# Patient Record
Sex: MALE | URBAN - METROPOLITAN AREA
[De-identification: names, ages, dates, MRNs, and addresses within clinical notes are randomized per-mention and may not be internally consistent; named-entity substitution may affect disease eponyms.]

---

## 2020-11-24 ENCOUNTER — TELEPHONE (OUTPATIENT)
Dept: ENDOSCOPY | Facility: HOSPITAL | Age: 70
End: 2020-11-24

## 2020-11-24 NOTE — TELEPHONE ENCOUNTER
----- Message from Rigoberto Mills sent at 11/24/2020 11:25 AM CST -----  COCOA with the [facility] would like this order to be expedited if possible.  Referral in  for EUS. Please schedule and call patient

## 2020-12-07 ENCOUNTER — TELEPHONE (OUTPATIENT)
Dept: ENDOSCOPY | Facility: HOSPITAL | Age: 70
End: 2020-12-07

## 2020-12-07 NOTE — TELEPHONE ENCOUNTER
----- Message from Meghann Jeff MA sent at 12/7/2020  9:22 AM CST -----    ----- Message -----  From: Rigoberto Mills  Sent: 12/7/2020   9:04 AM CST  To: [facility] Clinical Staff    Please can this referral be expedited.per  [facility] agent Marck. [facility] called for referral status. Referral in .  Please Anna/supervisor  call Marck at  [phone]  ext [phone]

## 2020-12-08 ENCOUNTER — TELEPHONE (OUTPATIENT)
Dept: ENDOSCOPY | Facility: HOSPITAL | Age: 70
End: 2020-12-08

## 2020-12-08 NOTE — TELEPHONE ENCOUNTER
This was reviewed but there is no EGD report. Request have been made. Clinic  is going to attempted to request EGD report again. Unable to schedule with that being reviewed

## 2020-12-08 NOTE — TELEPHONE ENCOUNTER
----- Message from Theresa Parks MA sent at 12/8/2020  1:25 PM CST -----    ----- Message -----  From: Rigoberto Mills  Sent: 12/8/2020   1:15 PM CST  To: [facility] Clinical Staff    Patient returned called to [facility] agent, because he missed a call from Roma/supervisor.  Please call patient again asap.  He is wanting top schedule

## 2020-12-09 ENCOUNTER — TELEPHONE (OUTPATIENT)
Dept: GASTROENTEROLOGY | Facility: CLINIC | Age: 70
End: 2020-12-09

## 2020-12-09 ENCOUNTER — TELEPHONE (OUTPATIENT)
Dept: ENDOSCOPY | Facility: HOSPITAL | Age: 70
End: 2020-12-09

## 2020-12-09 DIAGNOSIS — K31.89 GASTRIC NODULE: Primary | ICD-10-CM

## 2020-12-09 NOTE — TELEPHONE ENCOUNTER
MD Linda Costello MA   Caller: Unspecified (Today, 11:48 AM)             Need EUS/EGD for gastric body nodule. Radial. Main or [facility]. 45 minutes.   Iker Capone MD      Please sign order

## 2020-12-09 NOTE — TELEPHONE ENCOUNTER
----- Message from Rigoberto Mills sent at 12/9/2020 11:41 AM CST -----  Marck with the [facility] is needing the patient consult referral ( EUS) expedited she says.  Referral is in . Please call patient to schedule.  Marck number at [facility] is [phone] ext [phone] would like a call when scheduled.

## 2020-12-09 NOTE — TELEPHONE ENCOUNTER
Spoke with Marck.   Patient did not have an EGD. Has a SML.  Please review. Records are under Media

## 2020-12-10 ENCOUNTER — TELEPHONE (OUTPATIENT)
Dept: ENDOSCOPY | Facility: HOSPITAL | Age: 70
End: 2020-12-10

## 2020-12-10 NOTE — TELEPHONE ENCOUNTER
----- Message from Megan Rodríguez sent at 12/10/2020  9:10 AM CST -----  Regarding: call back  Contact: pt  Pt was returning  from yesterday     Pt can be reached at [phone]

## 2020-12-11 ENCOUNTER — TELEPHONE (OUTPATIENT)
Dept: GASTROENTEROLOGY | Facility: CLINIC | Age: 70
End: 2020-12-11

## 2020-12-11 NOTE — TELEPHONE ENCOUNTER
----- Message from Rigoberto Mills sent at 12/11/2020 10:46 AM CST -----  Please ask supervisor Michelle to expedite appointment if possible. Patient missed her call and contacted [facility] agent Marck. Please try to call Mr. Antunez again. Referral in

## 2020-12-14 ENCOUNTER — TELEPHONE (OUTPATIENT)
Dept: ENDOSCOPY | Facility: HOSPITAL | Age: 70
End: 2020-12-14

## 2020-12-18 ENCOUNTER — TELEPHONE (OUTPATIENT)
Dept: ENDOSCOPY | Facility: HOSPITAL | Age: 70
End: 2020-12-18

## 2020-12-18 NOTE — TELEPHONE ENCOUNTER
Spoke with patient. EGD/EUS scheduled for 1/7 at 2p. Reviewed prep instructions. Mr garcia verbalized understanding.

## 2020-12-31 ENCOUNTER — TELEPHONE (OUTPATIENT)
Dept: ENDOSCOPY | Facility: HOSPITAL | Age: 70
End: 2020-12-31

## 2020-12-31 DIAGNOSIS — Z01.818 PRE-OP TESTING: ICD-10-CM

## 2020-12-31 RX ORDER — LISINOPRIL AND HYDROCHLOROTHIAZIDE 12.5; 2 MG/1; MG/1
1 TABLET ORAL DAILY
COMMUNITY

## 2020-12-31 RX ORDER — TIZANIDINE HYDROCHLORIDE 4 MG/1
CAPSULE, GELATIN COATED ORAL
COMMUNITY

## 2020-12-31 RX ORDER — PRAVASTATIN SODIUM 80 MG/1
80 TABLET ORAL DAILY
COMMUNITY

## 2020-12-31 RX ORDER — ASPIRIN 81 MG/1
81 TABLET ORAL DAILY
COMMUNITY

## 2020-12-31 RX ORDER — GABAPENTIN 300 MG/1
300 CAPSULE ORAL 3 TIMES DAILY
COMMUNITY

## 2020-12-31 NOTE — TELEPHONE ENCOUNTER
Received request to schedule patient for EUS/EGD on 1/7/21 at 2:00pm. Spoke with Patient. Reviewed medical history and medications. Instructed on procedure and prep. Patient verbalized understanding of instructions. E-mailed copy of instructions to address in chart.

## 2021-01-04 ENCOUNTER — LAB VISIT (OUTPATIENT)
Dept: INTERNAL MEDICINE | Facility: CLINIC | Age: 71
End: 2021-01-04
Payer: OTHER GOVERNMENT

## 2021-01-04 DIAGNOSIS — Z01.818 PRE-OP TESTING: ICD-10-CM

## 2021-01-04 PROCEDURE — U0003 INFECTIOUS AGENT DETECTION BY NUCLEIC ACID (DNA OR RNA); SEVERE ACUTE RESPIRATORY SYNDROME CORONAVIRUS 2 (SARS-COV-2) (CORONAVIRUS DISEASE [COVID-19]), AMPLIFIED PROBE TECHNIQUE, MAKING USE OF HIGH THROUGHPUT TECHNOLOGIES AS DESCRIBED BY CMS-2020-01-R: HCPCS

## 2021-01-05 LAB — SARS-COV-2 RNA RESP QL NAA+PROBE: NOT DETECTED

## 2021-01-07 ENCOUNTER — HOSPITAL ENCOUNTER (OUTPATIENT)
Facility: HOSPITAL | Age: 71
Discharge: HOME | End: 2021-01-07
Attending: INTERNAL MEDICINE | Admitting: INTERNAL MEDICINE
Payer: OTHER GOVERNMENT

## 2021-01-07 ENCOUNTER — ANESTHESIA EVENT (OUTPATIENT)
Dept: ENDOSCOPY | Facility: HOSPITAL | Age: 71
End: 2021-01-07
Payer: OTHER GOVERNMENT

## 2021-01-07 ENCOUNTER — ANESTHESIA (OUTPATIENT)
Dept: ENDOSCOPY | Facility: HOSPITAL | Age: 71
End: 2021-01-07
Payer: OTHER GOVERNMENT

## 2021-01-07 VITALS
OXYGEN SATURATION: 98 % | DIASTOLIC BLOOD PRESSURE: 77 MMHG | SYSTOLIC BLOOD PRESSURE: 165 MMHG | HEIGHT: 69 IN | BODY MASS INDEX: 23.7 KG/M2 | RESPIRATION RATE: 21 BRPM | WEIGHT: 160 LBS | HEART RATE: 72 BPM | TEMPERATURE: 98 F

## 2021-01-07 DIAGNOSIS — K31.89 GASTRIC NODULE: ICD-10-CM

## 2021-01-07 PROCEDURE — 25000003 PHARM REV CODE 250: Performed by: INTERNAL MEDICINE

## 2021-01-07 PROCEDURE — 25000003 PHARM REV CODE 250: Performed by: NURSE ANESTHETIST, CERTIFIED REGISTERED

## 2021-01-07 PROCEDURE — 43259 PR ENDOSCOPIC ULTRASOUND EXAM: ICD-10-PCS | Mod: ,,, | Performed by: INTERNAL MEDICINE

## 2021-01-07 PROCEDURE — 43259 EGD US EXAM DUODENUM/JEJUNUM: CPT | Performed by: INTERNAL MEDICINE

## 2021-01-07 PROCEDURE — 00813 ANES UPR LWR GI NDSC PX: CPT | Performed by: INTERNAL MEDICINE

## 2021-01-07 PROCEDURE — 37000008 HC ANESTHESIA 1ST 15 MINUTES: Performed by: INTERNAL MEDICINE

## 2021-01-07 PROCEDURE — 37000009 HC ANESTHESIA EA ADD 15 MINS: Performed by: INTERNAL MEDICINE

## 2021-01-07 PROCEDURE — D9220A PRA ANESTHESIA: Mod: ,,, | Performed by: ANESTHESIOLOGY

## 2021-01-07 PROCEDURE — 63600175 PHARM REV CODE 636 W HCPCS: Performed by: NURSE ANESTHETIST, CERTIFIED REGISTERED

## 2021-01-07 PROCEDURE — D9220A PRA ANESTHESIA: ICD-10-PCS | Mod: ,,, | Performed by: ANESTHESIOLOGY

## 2021-01-07 PROCEDURE — 43259 EGD US EXAM DUODENUM/JEJUNUM: CPT | Mod: ,,, | Performed by: INTERNAL MEDICINE

## 2021-01-07 RX ORDER — LIDOCAINE HYDROCHLORIDE 20 MG/ML
INJECTION INTRAVENOUS
Status: DISCONTINUED | OUTPATIENT
Start: 2021-01-07 | End: 2021-01-07

## 2021-01-07 RX ORDER — PROPOFOL 10 MG/ML
VIAL (ML) INTRAVENOUS
Status: DISCONTINUED | OUTPATIENT
Start: 2021-01-07 | End: 2021-01-07

## 2021-01-07 RX ORDER — PROPOFOL 10 MG/ML
VIAL (ML) INTRAVENOUS CONTINUOUS PRN
Status: DISCONTINUED | OUTPATIENT
Start: 2021-01-07 | End: 2021-01-07

## 2021-01-07 RX ORDER — SODIUM CHLORIDE 9 MG/ML
INJECTION, SOLUTION INTRAVENOUS CONTINUOUS
Status: DISCONTINUED | OUTPATIENT
Start: 2021-01-07 | End: 2021-01-07 | Stop reason: HOSPADM

## 2021-01-07 RX ORDER — ONDANSETRON 2 MG/ML
4 INJECTION INTRAMUSCULAR; INTRAVENOUS DAILY PRN
Status: DISCONTINUED | OUTPATIENT
Start: 2021-01-07 | End: 2021-01-07 | Stop reason: HOSPADM

## 2021-01-07 RX ORDER — SODIUM CHLORIDE 0.9 % (FLUSH) 0.9 %
3 SYRINGE (ML) INJECTION EVERY 30 MIN PRN
Status: DISCONTINUED | OUTPATIENT
Start: 2021-01-07 | End: 2021-01-07 | Stop reason: HOSPADM

## 2021-01-07 RX ADMIN — SODIUM CHLORIDE: 0.9 INJECTION, SOLUTION INTRAVENOUS at 01:01

## 2021-01-07 RX ADMIN — PROPOFOL 175 MCG/KG/MIN: 10 INJECTION, EMULSION INTRAVENOUS at 01:01

## 2021-01-07 RX ADMIN — PROPOFOL 50 MG: 10 INJECTION, EMULSION INTRAVENOUS at 01:01

## 2021-01-07 RX ADMIN — LIDOCAINE HYDROCHLORIDE 50 MG: 20 INJECTION, SOLUTION INTRAVENOUS at 01:01

## 2021-09-13 ENCOUNTER — TELEPHONE (OUTPATIENT)
Dept: ENDOSCOPY | Facility: HOSPITAL | Age: 71
End: 2021-09-13

## 2021-09-13 DIAGNOSIS — R19.8 ABNORMAL FINDINGS ON ESOPHAGOGASTRODUODENOSCOPY (EGD): Primary | ICD-10-CM

## 2021-09-17 ENCOUNTER — TELEPHONE (OUTPATIENT)
Dept: ENDOSCOPY | Facility: HOSPITAL | Age: 71
End: 2021-09-17

## 2021-09-22 ENCOUNTER — TELEPHONE (OUTPATIENT)
Dept: ENDOSCOPY | Facility: HOSPITAL | Age: 71
End: 2021-09-22

## 2021-09-22 DIAGNOSIS — Z01.818 PRE-OP TESTING: ICD-10-CM

## 2021-09-27 ENCOUNTER — LAB VISIT (OUTPATIENT)
Dept: SPORTS MEDICINE | Facility: CLINIC | Age: 71
End: 2021-09-27
Payer: OTHER GOVERNMENT

## 2021-09-27 DIAGNOSIS — Z01.818 PRE-OP TESTING: ICD-10-CM

## 2021-09-27 PROCEDURE — U0003 INFECTIOUS AGENT DETECTION BY NUCLEIC ACID (DNA OR RNA); SEVERE ACUTE RESPIRATORY SYNDROME CORONAVIRUS 2 (SARS-COV-2) (CORONAVIRUS DISEASE [COVID-19]), AMPLIFIED PROBE TECHNIQUE, MAKING USE OF HIGH THROUGHPUT TECHNOLOGIES AS DESCRIBED BY CMS-2020-01-R: HCPCS | Performed by: FAMILY MEDICINE

## 2021-09-27 PROCEDURE — U0005 INFEC AGEN DETEC AMPLI PROBE: HCPCS | Performed by: FAMILY MEDICINE

## 2021-09-28 LAB
SARS-COV-2 RNA RESP QL NAA+PROBE: NOT DETECTED
SARS-COV-2- CYCLE NUMBER: NORMAL

## 2021-09-30 ENCOUNTER — TELEPHONE (OUTPATIENT)
Dept: ENDOSCOPY | Facility: HOSPITAL | Age: 71
End: 2021-09-30

## 2021-10-19 ENCOUNTER — TELEPHONE (OUTPATIENT)
Dept: ENDOSCOPY | Facility: HOSPITAL | Age: 71
End: 2021-10-19

## 2021-10-19 DIAGNOSIS — R19.8 ABNORMAL FINDINGS ON ESOPHAGOGASTRODUODENOSCOPY (EGD): Primary | ICD-10-CM

## 2021-10-29 ENCOUNTER — ANESTHESIA EVENT (OUTPATIENT)
Dept: ENDOSCOPY | Facility: HOSPITAL | Age: 71
End: 2021-10-29
Payer: OTHER GOVERNMENT

## 2021-10-29 ENCOUNTER — HOSPITAL ENCOUNTER (OUTPATIENT)
Facility: HOSPITAL | Age: 71
Discharge: HOME | End: 2021-10-29
Attending: INTERNAL MEDICINE | Admitting: INTERNAL MEDICINE
Payer: OTHER GOVERNMENT

## 2021-10-29 ENCOUNTER — ANESTHESIA (OUTPATIENT)
Dept: ENDOSCOPY | Facility: HOSPITAL | Age: 71
End: 2021-10-29
Payer: OTHER GOVERNMENT

## 2021-10-29 VITALS
RESPIRATION RATE: 18 BRPM | WEIGHT: 142 LBS | HEIGHT: 70 IN | OXYGEN SATURATION: 95 % | SYSTOLIC BLOOD PRESSURE: 143 MMHG | HEART RATE: 60 BPM | BODY MASS INDEX: 20.33 KG/M2 | TEMPERATURE: 97 F | DIASTOLIC BLOOD PRESSURE: 70 MMHG

## 2021-10-29 DIAGNOSIS — K31.9 GASTRIC LESION: ICD-10-CM

## 2021-10-29 DIAGNOSIS — K31.89 GASTRIC NODULE: ICD-10-CM

## 2021-10-29 PROCEDURE — 27201012 HC FORCEPS, HOT/COLD, DISP: Performed by: INTERNAL MEDICINE

## 2021-10-29 PROCEDURE — 37000009 HC ANESTHESIA EA ADD 15 MINS: Performed by: INTERNAL MEDICINE

## 2021-10-29 PROCEDURE — 25000003 PHARM REV CODE 250: Performed by: NURSE ANESTHETIST, CERTIFIED REGISTERED

## 2021-10-29 PROCEDURE — 00811 ANES LWR INTST NDSC NOS: CPT | Performed by: INTERNAL MEDICINE

## 2021-10-29 PROCEDURE — 43259 EGD US EXAM DUODENUM/JEJUNUM: CPT | Mod: ,,, | Performed by: INTERNAL MEDICINE

## 2021-10-29 PROCEDURE — 63600175 PHARM REV CODE 636 W HCPCS: Performed by: NURSE ANESTHETIST, CERTIFIED REGISTERED

## 2021-10-29 PROCEDURE — 43239 PR EGD, FLEX, W/BIOPSY, SGL/MULTI: ICD-10-PCS | Mod: 59,,, | Performed by: INTERNAL MEDICINE

## 2021-10-29 PROCEDURE — 88305 TISSUE EXAM BY PATHOLOGIST: ICD-10-PCS | Mod: 26,,, | Performed by: PATHOLOGY

## 2021-10-29 PROCEDURE — 43239 EGD BIOPSY SINGLE/MULTIPLE: CPT | Mod: 59,,, | Performed by: INTERNAL MEDICINE

## 2021-10-29 PROCEDURE — D9220A PRA ANESTHESIA: ICD-10-PCS | Mod: ,,, | Performed by: ANESTHESIOLOGY

## 2021-10-29 PROCEDURE — 43259 PR ENDOSCOPIC ULTRASOUND EXAM: ICD-10-PCS | Mod: ,,, | Performed by: INTERNAL MEDICINE

## 2021-10-29 PROCEDURE — 43239 EGD BIOPSY SINGLE/MULTIPLE: CPT | Performed by: INTERNAL MEDICINE

## 2021-10-29 PROCEDURE — 88305 TISSUE EXAM BY PATHOLOGIST: CPT | Performed by: PATHOLOGY

## 2021-10-29 PROCEDURE — 88305 TISSUE EXAM BY PATHOLOGIST: CPT | Mod: 26,,, | Performed by: PATHOLOGY

## 2021-10-29 PROCEDURE — 43259 EGD US EXAM DUODENUM/JEJUNUM: CPT | Performed by: INTERNAL MEDICINE

## 2021-10-29 PROCEDURE — 37000008 HC ANESTHESIA 1ST 15 MINUTES: Performed by: INTERNAL MEDICINE

## 2021-10-29 PROCEDURE — D9220A PRA ANESTHESIA: Mod: ,,, | Performed by: ANESTHESIOLOGY

## 2021-10-29 RX ORDER — LIDOCAINE HYDROCHLORIDE 20 MG/ML
INJECTION INTRAVENOUS
Status: DISCONTINUED | OUTPATIENT
Start: 2021-10-29 | End: 2021-10-29

## 2021-10-29 RX ORDER — PROPOFOL 10 MG/ML
VIAL (ML) INTRAVENOUS
Status: DISCONTINUED | OUTPATIENT
Start: 2021-10-29 | End: 2021-10-29

## 2021-10-29 RX ORDER — SODIUM CHLORIDE 0.9 % (FLUSH) 0.9 %
10 SYRINGE (ML) INJECTION
Status: DISCONTINUED | OUTPATIENT
Start: 2021-10-29 | End: 2021-10-29 | Stop reason: HOSPADM

## 2021-10-29 RX ORDER — SODIUM CHLORIDE 9 MG/ML
INJECTION, SOLUTION INTRAVENOUS CONTINUOUS
Status: DISCONTINUED | OUTPATIENT
Start: 2021-10-29 | End: 2021-10-29 | Stop reason: HOSPADM

## 2021-10-29 RX ORDER — PROPOFOL 10 MG/ML
VIAL (ML) INTRAVENOUS CONTINUOUS PRN
Status: DISCONTINUED | OUTPATIENT
Start: 2021-10-29 | End: 2021-10-29

## 2021-10-29 RX ADMIN — PROPOFOL 50 MG: 10 INJECTION, EMULSION INTRAVENOUS at 01:10

## 2021-10-29 RX ADMIN — Medication 175 MCG/KG/MIN: at 01:10

## 2021-10-29 RX ADMIN — PROPOFOL 30 MG: 10 INJECTION, EMULSION INTRAVENOUS at 01:10

## 2021-10-29 RX ADMIN — SODIUM CHLORIDE: 0.9 INJECTION, SOLUTION INTRAVENOUS at 12:10

## 2021-10-29 RX ADMIN — LIDOCAINE HYDROCHLORIDE 40 MG: 20 INJECTION, SOLUTION INTRAVENOUS at 01:10

## 2021-10-29 RX ADMIN — PROPOFOL 20 MG: 10 INJECTION, EMULSION INTRAVENOUS at 01:10

## 2021-10-29 RX ADMIN — LIDOCAINE HYDROCHLORIDE 60 MG: 20 INJECTION, SOLUTION INTRAVENOUS at 01:10

## 2021-10-29 RX ADMIN — GLYCOPYRROLATE 0.1 MG: 0.2 INJECTION, SOLUTION INTRAMUSCULAR; INTRAVITREAL at 01:10

## 2021-11-18 LAB
FINAL PATHOLOGIC DIAGNOSIS: NORMAL
GROSS: NORMAL
Lab: NORMAL

## 2021-11-19 ENCOUNTER — TELEPHONE (OUTPATIENT)
Dept: ENDOSCOPY | Facility: HOSPITAL | Age: 71
End: 2021-11-19
Payer: OTHER GOVERNMENT

## 2022-10-10 DIAGNOSIS — R93.89 ABNORMAL FINDING ON IMAGING: Primary | ICD-10-CM

## 2022-10-11 ENCOUNTER — TELEPHONE (OUTPATIENT)
Dept: ENDOSCOPY | Facility: HOSPITAL | Age: 72
End: 2022-10-11
Payer: OTHER GOVERNMENT

## 2022-10-11 RX ORDER — OMEPRAZOLE 40 MG/1
40 CAPSULE, DELAYED RELEASE ORAL DAILY
COMMUNITY

## 2022-10-11 RX ORDER — CHOLECALCIFEROL (VITAMIN D3) 25 MCG
1000 TABLET ORAL DAILY
COMMUNITY

## 2022-10-11 RX ORDER — MIRTAZAPINE 30 MG/1
30 TABLET, FILM COATED ORAL NIGHTLY
COMMUNITY

## 2022-10-11 RX ORDER — FAMOTIDINE 40 MG/1
40 TABLET, FILM COATED ORAL DAILY
COMMUNITY

## 2022-10-11 RX ORDER — MELOXICAM 15 MG/1
15 TABLET ORAL DAILY
COMMUNITY

## 2022-10-11 RX ORDER — METHOCARBAMOL 750 MG/1
500 TABLET, FILM COATED ORAL 4 TIMES DAILY
COMMUNITY

## 2022-10-11 RX ORDER — SILDENAFIL 100 MG/1
100 TABLET, FILM COATED ORAL DAILY PRN
COMMUNITY

## 2022-10-11 RX ORDER — METOPROLOL SUCCINATE 50 MG/1
50 TABLET, EXTENDED RELEASE ORAL DAILY
COMMUNITY

## 2022-10-11 RX ORDER — MULTIVIT WITH MINERALS/HERBS
1 TABLET ORAL DAILY
COMMUNITY

## 2022-10-11 NOTE — TELEPHONE ENCOUNTER
Telephoned pt to schedule EGD/EUS.  Spoke with pt and procedure scheduled for 10/19/22 at 9:45am at [facility].  Pt is fully vaccinated.  Reviewed medical history and medications.  Pt is currently taking Eliquis prescribed by [facility].  Approval requested from Meghann Bernal with [facility]-pending approval.  Instructed on procedure and preparation.  Pt verbalized understanding.  Copy of instructions sent via email.

## 2022-10-14 ENCOUNTER — TELEPHONE (OUTPATIENT)
Dept: ENDOSCOPY | Facility: HOSPITAL | Age: 72
End: 2022-10-14
Payer: OTHER GOVERNMENT

## 2022-10-17 ENCOUNTER — TELEPHONE (OUTPATIENT)
Dept: ENDOSCOPY | Facility: HOSPITAL | Age: 72
End: 2022-10-17
Payer: OTHER GOVERNMENT

## 2022-10-17 NOTE — TELEPHONE ENCOUNTER
Spoke with patient about arrival time @ 0830.   EGD/Upper EUS  Covid test = vacc    NPO status reviewed: Patient may eat until 7:00pm.  After 7pm, pt may have CLEAR liquids ONLY until completely NPO at Midnight.    Medications: Do not take Insulin or oral diabetic medications the day of the procedure.    Take as prescribed: heart, seizure and blood pressure medication in the morning with a sip of water (less than an ounce).  Take any breathing medications and bring inhalers to hospital with you.     Leave all valuables and jewelry at home. Wear comfortable clothes to procedure to change into hospital gown.   You cannot drive for 24 hours after your procedure because you will receive sedation for your procedure to make you comfortable.    A ride must be provided at discharge.

## 2022-10-19 ENCOUNTER — ANESTHESIA (OUTPATIENT)
Dept: ENDOSCOPY | Facility: HOSPITAL | Age: 72
End: 2022-10-19
Payer: OTHER GOVERNMENT

## 2022-10-19 ENCOUNTER — ANESTHESIA EVENT (OUTPATIENT)
Dept: ENDOSCOPY | Facility: HOSPITAL | Age: 72
End: 2022-10-19
Payer: OTHER GOVERNMENT

## 2022-10-19 ENCOUNTER — HOSPITAL ENCOUNTER (OUTPATIENT)
Facility: HOSPITAL | Age: 72
Discharge: HOME | End: 2022-10-19
Attending: INTERNAL MEDICINE | Admitting: INTERNAL MEDICINE
Payer: OTHER GOVERNMENT

## 2022-10-19 VITALS
DIASTOLIC BLOOD PRESSURE: 70 MMHG | SYSTOLIC BLOOD PRESSURE: 123 MMHG | RESPIRATION RATE: 15 BRPM | HEART RATE: 85 BPM | OXYGEN SATURATION: 95 % | WEIGHT: 175 LBS | HEIGHT: 68 IN | TEMPERATURE: 98 F | BODY MASS INDEX: 26.52 KG/M2

## 2022-10-19 DIAGNOSIS — R93.3 ABNORMAL FINDING ON GI TRACT IMAGING: ICD-10-CM

## 2022-10-19 PROCEDURE — 88342 IMHCHEM/IMCYTCHM 1ST ANTB: CPT | Mod: 26,,, | Performed by: PATHOLOGY

## 2022-10-19 PROCEDURE — 63600175 PHARM REV CODE 636 W HCPCS: Performed by: NURSE ANESTHETIST, CERTIFIED REGISTERED

## 2022-10-19 PROCEDURE — 88341 PR IHC OR ICC EACH ADD'L SINGLE ANTIBODY  STAINPR: ICD-10-PCS | Mod: 26,,, | Performed by: PATHOLOGY

## 2022-10-19 PROCEDURE — 00731 ANES UPR GI NDSC PX NOS: CPT | Performed by: INTERNAL MEDICINE

## 2022-10-19 PROCEDURE — 25000003 PHARM REV CODE 250: Performed by: INTERNAL MEDICINE

## 2022-10-19 PROCEDURE — 43239 PR EGD, FLEX, W/BIOPSY, SGL/MULTI: ICD-10-PCS | Mod: 59,,, | Performed by: INTERNAL MEDICINE

## 2022-10-19 PROCEDURE — 88341 IMHCHEM/IMCYTCHM EA ADD ANTB: CPT | Mod: 26,,, | Performed by: PATHOLOGY

## 2022-10-19 PROCEDURE — 27201012 HC FORCEPS, HOT/COLD, DISP: Performed by: INTERNAL MEDICINE

## 2022-10-19 PROCEDURE — 43239 EGD BIOPSY SINGLE/MULTIPLE: CPT | Mod: 59 | Performed by: INTERNAL MEDICINE

## 2022-10-19 PROCEDURE — 88342 IMHCHEM/IMCYTCHM 1ST ANTB: CPT | Performed by: PATHOLOGY

## 2022-10-19 PROCEDURE — 88305 TISSUE EXAM BY PATHOLOGIST: CPT | Mod: 26,,, | Performed by: PATHOLOGY

## 2022-10-19 PROCEDURE — 88305 TISSUE EXAM BY PATHOLOGIST: ICD-10-PCS | Mod: 26,,, | Performed by: PATHOLOGY

## 2022-10-19 PROCEDURE — 88341 IMHCHEM/IMCYTCHM EA ADD ANTB: CPT | Performed by: PATHOLOGY

## 2022-10-19 PROCEDURE — 43239 EGD BIOPSY SINGLE/MULTIPLE: CPT | Mod: 59,,, | Performed by: INTERNAL MEDICINE

## 2022-10-19 PROCEDURE — 88342 CHG IMMUNOCYTOCHEMISTRY: ICD-10-PCS | Mod: 26,,, | Performed by: PATHOLOGY

## 2022-10-19 PROCEDURE — 43259 PR ENDOSCOPIC ULTRASOUND EXAM: ICD-10-PCS | Mod: ,,, | Performed by: INTERNAL MEDICINE

## 2022-10-19 PROCEDURE — 37000009 HC ANESTHESIA EA ADD 15 MINS: Performed by: INTERNAL MEDICINE

## 2022-10-19 PROCEDURE — 88305 TISSUE EXAM BY PATHOLOGIST: CPT | Performed by: PATHOLOGY

## 2022-10-19 PROCEDURE — 43259 EGD US EXAM DUODENUM/JEJUNUM: CPT | Mod: ,,, | Performed by: INTERNAL MEDICINE

## 2022-10-19 PROCEDURE — 37000008 HC ANESTHESIA 1ST 15 MINUTES: Performed by: INTERNAL MEDICINE

## 2022-10-19 PROCEDURE — 25000003 PHARM REV CODE 250: Performed by: NURSE ANESTHETIST, CERTIFIED REGISTERED

## 2022-10-19 PROCEDURE — 43259 EGD US EXAM DUODENUM/JEJUNUM: CPT | Performed by: INTERNAL MEDICINE

## 2022-10-19 RX ORDER — SODIUM CHLORIDE 0.9 % (FLUSH) 0.9 %
10 SYRINGE (ML) INJECTION
Status: DISCONTINUED | OUTPATIENT
Start: 2022-10-19 | End: 2022-10-19 | Stop reason: HOSPADM

## 2022-10-19 RX ORDER — DEXMEDETOMIDINE HYDROCHLORIDE 100 UG/ML
INJECTION, SOLUTION INTRAVENOUS
Status: DISCONTINUED | OUTPATIENT
Start: 2022-10-19 | End: 2022-10-19

## 2022-10-19 RX ORDER — PROPOFOL 10 MG/ML
VIAL (ML) INTRAVENOUS CONTINUOUS PRN
Status: DISCONTINUED | OUTPATIENT
Start: 2022-10-19 | End: 2022-10-19

## 2022-10-19 RX ORDER — PROPOFOL 10 MG/ML
VIAL (ML) INTRAVENOUS
Status: DISCONTINUED | OUTPATIENT
Start: 2022-10-19 | End: 2022-10-19

## 2022-10-19 RX ORDER — PHENYLEPHRINE HYDROCHLORIDE 10 MG/ML
INJECTION INTRAVENOUS
Status: DISCONTINUED | OUTPATIENT
Start: 2022-10-19 | End: 2022-10-19

## 2022-10-19 RX ORDER — LIDOCAINE HYDROCHLORIDE 20 MG/ML
INJECTION INTRAVENOUS
Status: DISCONTINUED | OUTPATIENT
Start: 2022-10-19 | End: 2022-10-19

## 2022-10-19 RX ORDER — SODIUM CHLORIDE 9 MG/ML
INJECTION, SOLUTION INTRAVENOUS CONTINUOUS
Status: DISCONTINUED | OUTPATIENT
Start: 2022-10-19 | End: 2022-10-19 | Stop reason: HOSPADM

## 2022-10-19 RX ADMIN — SODIUM CHLORIDE: 0.9 INJECTION, SOLUTION INTRAVENOUS at 09:10

## 2022-10-19 RX ADMIN — PHENYLEPHRINE HYDROCHLORIDE 100 MCG: 10 INJECTION INTRAVENOUS at 10:10

## 2022-10-19 RX ADMIN — PROPOFOL 70 MG: 10 INJECTION, EMULSION INTRAVENOUS at 10:10

## 2022-10-19 RX ADMIN — GLYCOPYRROLATE 0.2 MG: 0.2 INJECTION, SOLUTION INTRAMUSCULAR; INTRAVITREAL at 10:10

## 2022-10-19 RX ADMIN — PROPOFOL 150 MCG/KG/MIN: 10 INJECTION, EMULSION INTRAVENOUS at 10:10

## 2022-10-19 RX ADMIN — DEXMEDETOMIDINE HCL 8 MCG: 100 INJECTION INTRAVENOUS at 10:10

## 2022-10-19 RX ADMIN — LIDOCAINE HYDROCHLORIDE 100 MG: 20 INJECTION, SOLUTION INTRAVENOUS at 10:10

## 2022-10-19 NOTE — TRANSFER OF CARE
"Anesthesia Transfer of Care Note    Patient: Jaziel Antunez Jr.    Procedure(s) Performed: Procedure(s) (LRB):  ULTRASOUND, UPPER GI TRACT, ENDOSCOPIC (N/A)  EGD (ESOPHAGOGASTRODUODENOSCOPY) (N/A)    Patient location: GI    Anesthesia Type: MAC    Transport from OR: Transported from OR on room air with adequate spontaneous ventilation    Post pain: adequate analgesia    Post assessment: no apparent anesthetic complications    Post vital signs: stable    Level of consciousness: awake, alert and oriented    Nausea/Vomiting: no nausea/vomiting    Complications: none    Transfer of care protocol was followed      Last vitals:   Visit Vitals  BP (!) 157/88 (BP Location: Left arm, Patient Position: Lying)   Pulse 81   Temp 36.8 °C (98.2 °F) (Temporal)   Resp 18   Ht 5' 8" (1.727 m)   Wt 79.4 kg (175 lb)   SpO2 97%   BMI 26.61 kg/m²     "

## 2022-10-19 NOTE — PLAN OF CARE
Left a voicemail outlining visitor restrictions.     -Milagros Grey   Pt is ready for procedure. VSS. NPO status maintained. Bed in lowest position, call light within reach, and bed wheels locked.

## 2022-10-19 NOTE — ANESTHESIA POSTPROCEDURE EVALUATION
Anesthesia Post Evaluation    Patient: Jaziel Antunez Jr.    Procedure(s) Performed: Procedure(s) (LRB):  ULTRASOUND, UPPER GI TRACT, ENDOSCOPIC (N/A)  EGD (ESOPHAGOGASTRODUODENOSCOPY) (N/A)    Final Anesthesia Type: MAC      Patient location during evaluation: GI PACU  Patient participation: Yes- Able to Participate  Level of consciousness: awake and alert and oriented  Post-procedure vital signs: reviewed and stable  Pain management: adequate  Airway patency: patent    PONV status at discharge: No PONV  Anesthetic complications: no      Cardiovascular status: blood pressure returned to baseline  Respiratory status: unassisted, spontaneous ventilation and room air  Hydration status: euvolemic  Follow-up not needed.          Vitals Value Taken Time   BP 98/60 10/19/22 1105   Temp 36.6 °C (97.9 °F) 10/19/22 1105   Pulse 80 10/19/22 1105   Resp 20 10/19/22 1105   SpO2 98 % 10/19/22 1105         No case tracking events are documented in the log.      Pain/Jh Score: Jh Score: 9 (10/19/2022 11:05 AM)

## 2022-10-19 NOTE — H&P
Short Stay Endoscopy History and Physical    PCP - Primary Doctor No  Referring Physician - [facility]  [address]  [place] [address]    Procedure - EGD and EUS for abnormal imaging PET showed possible gastric mass along greater curve  ASA - per anesthesia  Mallampati - per anesthesia  History of Anesthesia problems - per anesthesia  Family history Anesthesia problems -  per anesthesia   Plan of anesthesia - per anesthesia    HPI:  This is a 72 y.o. male here for evaluation of: EGD and EUS for abnormal imaging PET showed possible gastric mass along greater curve - Eliquis held 48hr. Intermittent dysphagia since starting radiation therapy for lung cancer    Reflux - no  Dysphagia - intermittently (on radiation for lung ca)  Abdominal pain - no  Diarrhea - no    ROS:  Constitutional: No fevers, chills, No weight loss  CV: No chest pain  Pulm: No cough, No shortness of breath  Ophtho: No vision changes  GI: see HPI  Derm: No rash    Medical History:  has a past medical history of Arthritis, GERD (gastroesophageal reflux disease), Herniated thoracic disc without myelopathy, High cholesterol, HTN (hypertension), and Lung cancer (2022).    Surgical History:  has a past surgical history that includes Esophagogastroduodenoscopy (2015); Colonoscopy; right knee surgery; Endoscopic ultrasound of upper gastrointestinal tract (N/A, 1/7/2021); Esophagogastroduodenoscopy (N/A, 1/7/2021); Insertion of implantable loop recorder (Left); Endoscopic ultrasound of upper gastrointestinal tract (N/A, 10/29/2021); Esophagogastroduodenoscopy (N/A, 10/29/2021); Esophagogastroduodenoscopy (N/A, 10/29/2021); and Endoscopic ultrasound of upper gastrointestinal tract (N/A, 10/29/2021).    Family History: family history is not on file..    Social History:  reports that he quit smoking about 4 months ago. His smoking use included cigars. He has never used smokeless tobacco. He reports current  alcohol use. He reports that he does not currently use drugs.    Review of patient's allergies indicates:  No Known Allergies    Medications:   Medications Prior to Admission   Medication Sig Dispense Refill Last Dose    aspirin (ECOTRIN) 81 MG EC tablet Take 81 mg by mouth once daily.   Past Week    b complex vitamins tablet Take 1 tablet by mouth once daily.   Past Week    famotidine (PEPCID) 40 MG tablet Take 40 mg by mouth once daily.   Past Week    gabapentin (NEURONTIN) 300 MG capsule Take 300 mg by mouth 3 (three) times daily.   Past Week    lisinopriL-hydrochlorothiazide (PRINZIDE,ZESTORETIC) 20-12.5 mg per tablet Take 1 tablet by mouth once daily.   10/19/2022    meloxicam (MOBIC) 15 MG tablet Take 15 mg by mouth once daily.   Past Week    methocarbamoL (ROBAXIN) 750 MG Tab Take 500 mg by mouth 4 (four) times daily.   Past Week    metoprolol succinate (TOPROL-XL) 50 MG 24 hr tablet Take 50 mg by mouth once daily.   Past Week    mirtazapine (REMERON) 30 MG tablet Take 30 mg by mouth every evening.   Past Week    omeprazole (PRILOSEC) 40 MG capsule Take 40 mg by mouth once daily.   Past Week    tiZANidine 4 mg Cap Take by mouth.   Past Week    vitamin D (VITAMIN D3) 1000 units Tab Take 1,000 Units by mouth once daily.   Past Week    apixaban (ELIQUIS) 5 mg Tab Take 5 mg by mouth 2 (two) times daily.   10/17/2022    ipratropium-albuteroL (COMBIVENT)  mcg/actuation inhaler Inhale 1 puff into the lungs every 6 (six) hours as needed for Wheezing. Rescue   More than a month    pravastatin (PRAVACHOL) 80 MG tablet Take 80 mg by mouth once daily.       sildenafiL (VIAGRA) 100 MG tablet Take 100 mg by mouth daily as needed for Erectile Dysfunction.          Physical Exam:    Vital Signs:   Vitals:    10/19/22 0936   BP: (!) 157/88   Pulse: 81   Resp: 18   Temp: 98.2 °F (36.8 °C)       General Appearance: Well appearing in no acute distress    Labs:  Lab Results   Component Value Date    INR 1.0 04/13/2011        I have explained the risks and benefits of this endoscopic procedure to the patient including but not limited to bleeding, inflammation, infection, perforation, missing a lesion and death.      Thomas Lockett MD

## 2022-10-19 NOTE — [employer] PATIENT INSTRUCTIONS
Discharge Summary/Instructions after an Endoscopic Procedure  Patient Name: Jaziel Antunez  Patient MRN: [id number]  Patient YOB: 1950  Wednesday, October 19, 2022  Thomas Lockett MD  Dear patient,  As a result of recent federal legislation (The Federal Cures Act), you may   receive lab or pathology results from your procedure in your [facility]   account before your physician is able to contact you. Your physician or   their representative will relay the results to you with their   recommendations at their soonest availability.  Thank you,  Your health is very important to us during the Covid Crisis. Following your   procedure today, you will receive a daily text for 2 weeks asking about   signs or symptoms of Covid 19.  Please respond to this text when you   receive it so we can follow up and keep you as safe as possible.   RESTRICTIONS:  During your procedure today, you received medications for sedation.  These   medications may affect your judgment, balance and coordination.  Therefore,   for 24 hours, you have the following restrictions:   - DO NOT drive a car, operate machinery, make legal/financial decisions,   sign important papers or drink alcohol.    ACTIVITY:  Today: no heavy lifting, straining or running due to procedural   sedation/anesthesia.  The following day: return to full activity including work.  DIET:  Eat and drink normally unless instructed otherwise.     TREATMENT FOR COMMON SIDE EFFECTS:  - Mild abdominal pain, nausea, belching, bloating or excessive gas:  rest,   eat lightly and use a heating pad.  - Sore Throat: treat with throat lozenges and/or gargle with warm salt   water.  - Because air was used during the procedure, expelling large amounts of air   from your rectum or belching is normal.  - If a bowel prep was taken, you may not have a bowel movement for 1-3 days.    This is normal.  SYMPTOMS TO WATCH FOR AND REPORT TO YOUR PHYSICIAN:  1. Abdominal pain or bloating, other than  gas cramps.  2. Chest pain.  3. Back pain.  4. Signs of infection such as: chills or fever occurring within 24 hours   after the procedure.  5. Rectal bleeding, which would show as bright red, maroon, or black stools.   (A tablespoon of blood from the rectum is not serious, especially if   hemorrhoids are present.)  6. Vomiting.  7. Weakness or dizziness.  GO DIRECTLY TO THE NEAREST EMERGENCY ROOM IF YOU HAVE ANY OF THE FOLLOWING:      Difficulty breathing              Chills and/or fever over 101 F   Persistent vomiting and/or vomiting blood   Severe abdominal pain   Severe chest pain   Black, tarry stools   Bleeding- more than one tablespoon   Any other symptom or condition that you feel may need urgent attention  Your doctor recommends these additional instructions:  If any biopsies were taken, your doctors clinic will contact you in 1 to 2   weeks with any results.  - Discharge patient to home (ambulatory).   - Patient has a contact number available for emergencies.  The signs and   symptoms of potential delayed complications were discussed with the   patient.  Return to normal activities tomorrow.  Written discharge   instructions were provided to the patient.   - Resume previous diet.   - Await path results.   - Use a proton pump inhibitor PO daily or twice daily (such as OTC Nexium or   Prilosec) taken 30-60min before a meal to allow the mild esophagitis to   heal.   - Return to referring physician.  For questions, problems or results please call your physician - Thomas Lockett MD.  EMERGENCY PHONE NUMBER: [phone],  LAB RESULTS: [phone]  IF A COMPLICATION OR EMERGENCY SITUATION ARISES AND YOU ARE UNABLE TO REACH   YOUR PHYSICIAN - GO DIRECTLY TO THE EMERGENCY ROOM.  Thomas Lockett MD  10/19/2022 11:17:17 AM  This report has been verified and signed electronically.  Dear patient,  As a result of recent federal legislation (The Federal Cures Act), you may   receive lab or pathology results from  your procedure in your [facility]   account before your physician is able to contact you. Your physician or   their representative will relay the results to you with their   recommendations at their soonest availability.  Thank you,  [employer]ATION

## 2022-10-19 NOTE — ANESTHESIA PREPROCEDURE EVALUATION
10/19/2022  Jaziel Antunez Jr. is a 72 y.o., male   Pre-operative evaluation for Procedure(s) (LRB):  EGD (ESOPHAGOGASTRODUODENOSCOPY) (N/A)  ULTRASOUND, UPPER GI TRACT, ENDOSCOPIC (N/A)    Jaziel Antunez Jr. is a 72 y.o. male     Patient Active Problem List   Diagnosis    Gastric nodule       Review of patient's allergies indicates:  No Known Allergies    Current Facility-Administered Medications on File Prior to Visit   Medication Dose Route Frequency Provider Last Rate Last Admin    0.9%  NaCl infusion   Intravenous Continuous Thomas Lockett MD        sodium chloride 0.9% flush 10 mL  10 mL Intravenous PRN Thomas Lockett MD         Current Outpatient Medications on File Prior to Visit   Medication Sig Dispense Refill    apixaban (ELIQUIS) 5 mg Tab Take 5 mg by mouth 2 (two) times daily.      aspirin (ECOTRIN) 81 MG EC tablet Take 81 mg by mouth once daily.      b complex vitamins tablet Take 1 tablet by mouth once daily.      famotidine (PEPCID) 40 MG tablet Take 40 mg by mouth once daily.      gabapentin (NEURONTIN) 300 MG capsule Take 300 mg by mouth 3 (three) times daily.      ipratropium-albuteroL (COMBIVENT)  mcg/actuation inhaler Inhale 1 puff into the lungs every 6 (six) hours as needed for Wheezing. Rescue      lisinopriL-hydrochlorothiazide (PRINZIDE,ZESTORETIC) 20-12.5 mg per tablet Take 1 tablet by mouth once daily.      meloxicam (MOBIC) 15 MG tablet Take 15 mg by mouth once daily.      methocarbamoL (ROBAXIN) 750 MG Tab Take 500 mg by mouth 4 (four) times daily.      metoprolol succinate (TOPROL-XL) 50 MG 24 hr tablet Take 50 mg by mouth once daily.      mirtazapine (REMERON) 30 MG tablet Take 30 mg by mouth every evening.      omeprazole (PRILOSEC) 40 MG capsule Take 40 mg by mouth once daily.      pravastatin (PRAVACHOL) 80 MG tablet Take 80 mg by mouth once daily.       sildenafiL (VIAGRA) 100 MG tablet Take 100 mg by mouth daily as needed for Erectile Dysfunction.      tiZANidine 4 mg Cap Take by mouth.      vitamin D (VITAMIN D3) 1000 units Tab Take 1,000 Units by mouth once daily.         Past Surgical History:   Procedure Laterality Date    COLONOSCOPY      ENDOSCOPIC ULTRASOUND OF UPPER GASTROINTESTINAL TRACT N/A 1/7/2021    Procedure: ULTRASOUND, UPPER GI TRACT, ENDOSCOPIC;  Surgeon: Robert Luna MD;  Location: [facility] ENDO (2ND FLR);  Service: Endoscopy;  Laterality: N/A;  Need EUS/EGD for gastric body nodule. Radial. [facility] or [place]. 45 minutes.  1/4-covid pcw-tb    ENDOSCOPIC ULTRASOUND OF UPPER GASTROINTESTINAL TRACT N/A 10/29/2021    Procedure: ULTRASOUND, UPPER GI TRACT, ENDOSCOPIC;  Surgeon: Iker Capone MD;  Location: [facility] GELA (2ND FLR);  Service: Endoscopy;  Laterality: N/A;  Iker Capone MD  Lindachidi Senior MA  Caller: Unspecified (Yesterday,  5:03 PM)  Need EGD/EUS (radial) for enlarge gastric fold and gastric nodule. 45 minutes. [facility] or [place].   Thanks,   Iker Capone MD   9/27-covid [place]    ENDOSCOPIC ULTRASOUND OF UPPER GASTROINTESTINAL TRACT N/A 10/29/2021    Procedure: ULTRASOUND, UPPER GI TRACT, ENDOSCOPIC;  Surgeon: Iker Capone MD;  Location: [facility] (2ND FLR);  Service: Endoscopy;  Laterality: N/A;    ESOPHAGOGASTRODUODENOSCOPY  2015    ESOPHAGOGASTRODUODENOSCOPY N/A 1/7/2021    Procedure: EGD (ESOPHAGOGASTRODUODENOSCOPY);  Surgeon: Robert Luna MD;  Location: [facility] ENDO (2ND FLR);  Service: Endoscopy;  Laterality: N/A;    ESOPHAGOGASTRODUODENOSCOPY N/A 10/29/2021    Procedure: EGD (ESOPHAGOGASTRODUODENOSCOPY);  Surgeon: Iker Capone MD;  Location: [facility] ENDO (2ND FLR);  Service: Endoscopy;  Laterality: N/A;    ESOPHAGOGASTRODUODENOSCOPY N/A 10/29/2021    Procedure: EGD (ESOPHAGOGASTRODUODENOSCOPY);  Surgeon: Iker Capone MD;  Location: [facility] ([address] [place] [address]);  Service: Endoscopy;  Laterality: N/A;   pt vaccinated-notified to bring card to visit-instructions emailed-MG    INSERTION OF IMPLANTABLE LOOP RECORDER Left     right knee surgery           Anesthesia Evaluation    I have reviewed the Patient Summary Reports.    I have reviewed the Nursing Notes. I have reviewed the NPO Status.   I have reviewed the Medications.     Review of Systems  Anesthesia Hx:  No problems with previous Anesthesia Denies Hx of Anesthetic complications  History of prior surgery of interest to airway management or planning: Denies Family Hx of Anesthesia complications.   Denies Personal Hx of Anesthesia complications.   Social:  Smoker, Social Alcohol Use    Hematology/Oncology:  Hematology Normal   Oncology Normal     EENT/Dental:EENT/Dental Normal   Cardiovascular:   Exercise tolerance: good Hypertension    Pulmonary:  Pulmonary Normal    Renal/:  Renal/ Normal     Hepatic/GI:   GERD Gastric nodule   Neurological:  Neurology Normal    Endocrine:  Endocrine Normal    Psych:  Psychiatric Normal           Physical Exam  General:  Well nourished    Airway/Jaw/Neck:  Airway Findings: Mouth Opening: Small, but > 3cm Tongue: Normal  General Airway Assessment: Adult, Average  Mallampati: III  TM Distance: Normal, at least 6 cm  Jaw/Neck Findings:  Neck ROM: Normal ROM      Dental:  Dental Findings: In tact   Chest/Lungs:  Chest/Lungs Findings: Normal Respiratory Rate, Clear to auscultation     Heart/Vascular:  Heart Findings: Rate: Normal  Rhythm: Regular Rhythm        Mental Status:  Mental Status Findings:  Alert and Oriented, Cooperative         Anesthesia Plan  Type of Anesthesia, risks & benefits discussed:  Anesthesia Type:  general    Patient's Preference:   Plan Factors:          Intra-op Monitoring Plan: standard ASA monitors  Intra-op Monitoring Plan Comments:   Post Op Pain Control Plan: per primary service following discharge from PACU, IV/PO Opioids PRN and multimodal analgesia  Post Op Pain Control Plan Comments:      Induction:   IV  Beta Blocker:  Patient is not currently on a Beta-Blocker (No further documentation required).       Informed Consent: Patient understands risks and agrees with Anesthesia plan.  Questions answered. Anesthesia consent signed with patient.  ASA Score: 2     Day of Surgery Review of History & Physical:    H&P update referred to the provider.         Ready For Surgery From Anesthesia Perspective.                                                                                                                    10/19/2022  Jaziel Antunez Jr. is a 72 y.o., male here for EUS    Past Medical History:   Diagnosis Date    Arthritis     GERD (gastroesophageal reflux disease)     Herniated thoracic disc without myelopathy     High cholesterol     HTN (hypertension)      Past Surgical History:   Procedure Laterality Date    COLONOSCOPY      ENDOSCOPIC ULTRASOUND OF UPPER GASTROINTESTINAL TRACT N/A 1/7/2021    Procedure: ULTRASOUND, UPPER GI TRACT, ENDOSCOPIC;  Surgeon: Robert Luna MD;  Location: [facility] ENDO (2ND FLR);  Service: Endoscopy;  Laterality: N/A;  Need EUS/EGD for gastric body nodule. Radial. Main or [place]. 45 minutes.  1/4-covid pcw-tb    ENDOSCOPIC ULTRASOUND OF UPPER GASTROINTESTINAL TRACT N/A 10/29/2021    Procedure: ULTRASOUND, UPPER GI TRACT, ENDOSCOPIC;  Surgeon: Iker Capone MD;  Location: [facility] ENDO (2ND FLR);  Service: Endoscopy;  Laterality: N/A;  MD Linda Costello MA  Caller: Unspecified (Yesterday,  5:03 PM)  Need EGD/EUS (radial) for enlarge gastric fold and gastric nodule. 45 minutes. [facility] [place].   Thanks,   Iker Capone MD   9/27-covid [place]    ENDOSCOPIC ULTRASOUND OF UPPER GASTROINTESTINAL TRACT N/A 10/29/2021    Procedure: ULTRASOUND, UPPER GI TRACT, ENDOSCOPIC;  Surgeon: Iker Capone MD;  Location: [facility] ENDO (2ND FLR);  Service: Endoscopy;  Laterality: N/A;    ESOPHAGOGASTRODUODENOSCOPY  2015     ESOPHAGOGASTRODUODENOSCOPY N/A 1/7/2021    Procedure: EGD (ESOPHAGOGASTRODUODENOSCOPY);  Surgeon: Robert Luna MD;  [facility]  Service: Endoscopy;  Laterality: N/A;    ESOPHAGOGASTRODUODENOSCOPY N/A 10/29/2021    Procedure: EGD (ESOPHAGOGASTRODUODENOSCOPY);  Surgeon: Iker Capone MD;  [address]  Service: Endoscopy;  Laterality: N/A;    ESOPHAGOGASTRODUODENOSCOPY N/A 10/29/2021    Procedure: EGD (ESOPHAGOGASTRODUODENOSCOPY);  Surgeon: Iker Capone MD;  [address]  Service: Endoscopy;  Laterality: N/A;  pt vaccinated-notified to bring card to visit-instructions emailed-MG    INSERTION OF IMPLANTABLE LOOP RECORDER Left     right knee surgery         Pre-op Assessment    I have reviewed the Patient Summary Reports.     I have reviewed the Nursing Notes. I have reviewed the NPO Status.      Review of Systems  Anesthesia Hx:  History of prior surgery of interest to airway management or planning:  Denies Personal Hx of Anesthesia complications.   Cardiovascular:   Hypertension hyperlipidemia    Hepatic/GI:   GERD    Endocrine:  Endocrine Normal        Physical Exam  General: Well nourished    Airway:  Mallampati: II   Mouth Opening: Normal  Tongue: Normal        Anesthesia Plan  Type of Anesthesia, risks & benefits discussed:    Anesthesia Type: MAC  Informed Consent: Informed consent signed with the Patient and all parties understand the risks and agree with anesthesia plan.  All questions answered.   ASA Score: 2    Ready For Surgery From Anesthesia Perspective.     .

## 2022-10-20 ENCOUNTER — TELEPHONE (OUTPATIENT)
Dept: ENDOSCOPY | Facility: HOSPITAL | Age: 72
End: 2022-10-20
Payer: OTHER GOVERNMENT

## 2022-10-20 NOTE — TELEPHONE ENCOUNTER
Post procedure f/u call, no answer, message left to call [phone] for any post procedure concerns

## 2022-10-25 LAB
FINAL PATHOLOGIC DIAGNOSIS: NORMAL
GROSS: NORMAL
Lab: NORMAL